# Patient Record
Sex: MALE | Race: OTHER | URBAN - METROPOLITAN AREA
[De-identification: names, ages, dates, MRNs, and addresses within clinical notes are randomized per-mention and may not be internally consistent; named-entity substitution may affect disease eponyms.]

---

## 2017-06-18 ENCOUNTER — EMERGENCY (EMERGENCY)
Facility: HOSPITAL | Age: 67
LOS: 1 days | Discharge: HOME | End: 2017-06-18
Admitting: INTERNAL MEDICINE
Payer: COMMERCIAL

## 2017-06-18 PROCEDURE — 99283 EMERGENCY DEPT VISIT LOW MDM: CPT | Mod: 25

## 2017-06-18 PROCEDURE — 90471 IMMUNIZATION ADMIN: CPT

## 2017-06-18 PROCEDURE — 12004 RPR S/N/AX/GEN/TRK7.6-12.5CM: CPT

## 2017-06-18 PROCEDURE — 90700 DTAP VACCINE < 7 YRS IM: CPT

## 2017-06-25 ENCOUNTER — EMERGENCY (EMERGENCY)
Facility: HOSPITAL | Age: 67
LOS: 1 days | Discharge: HOME | End: 2017-06-25
Admitting: INTERNAL MEDICINE
Payer: COMMERCIAL

## 2017-06-25 PROCEDURE — 73130 X-RAY EXAM OF HAND: CPT

## 2017-06-25 PROCEDURE — 73130 X-RAY EXAM OF HAND: CPT | Mod: 26,LT

## 2017-06-25 PROCEDURE — 99284 EMERGENCY DEPT VISIT MOD MDM: CPT

## 2017-06-25 PROCEDURE — 87205 SMEAR GRAM STAIN: CPT

## 2017-06-25 PROCEDURE — 36415 COLL VENOUS BLD VENIPUNCTURE: CPT

## 2017-06-25 PROCEDURE — 87070 CULTURE OTHR SPECIMN AEROBIC: CPT

## 2017-06-25 PROCEDURE — 99283 EMERGENCY DEPT VISIT LOW MDM: CPT | Mod: 25

## 2017-07-05 PROBLEM — Z00.00 ENCOUNTER FOR PREVENTIVE HEALTH EXAMINATION: Status: ACTIVE | Noted: 2017-07-05

## 2023-04-11 ENCOUNTER — OFFICE (OUTPATIENT)
Dept: URBAN - METROPOLITAN AREA CLINIC 34 | Facility: CLINIC | Age: 73
Setting detail: OPHTHALMOLOGY
End: 2023-04-11
Payer: COMMERCIAL

## 2023-04-11 DIAGNOSIS — H35.62: ICD-10-CM

## 2023-04-11 DIAGNOSIS — H43.813: ICD-10-CM

## 2023-04-11 DIAGNOSIS — H35.033: ICD-10-CM

## 2023-04-11 DIAGNOSIS — H43.391: ICD-10-CM

## 2023-04-11 PROCEDURE — 92250 FUNDUS PHOTOGRAPHY W/I&R: CPT | Performed by: OPHTHALMOLOGY

## 2023-04-11 PROCEDURE — 92014 COMPRE OPH EXAM EST PT 1/>: CPT | Performed by: OPHTHALMOLOGY

## 2023-04-11 ASSESSMENT — REFRACTION_CURRENTRX
OS_OVR_VA: 20/
OS_ADD: +3.50
OS_CYLINDER: +0.75
OS_AXIS: 179
OD_OVR_VA: 20/
OS_VPRISM_DIRECTION: BF
OD_AXIS: 137
OD_VPRISM_DIRECTION: BF
OS_ADD: +2.75
OD_OVR_VA: 20/
OS_SPHERE: -1.25
OD_CYLINDER: SPHERE
OS_VPRISM_DIRECTION: BF
OD_SPHERE: +0.75
OD_CYLINDER: +2.25
OD_AXIS: 147
OD_OVR_VA: 20/
OD_SPHERE: +2.50
OD_VPRISM_DIRECTION: BF
OS_VPRISM_DIRECTION: BF
OD_ADD: +0.25
OS_ADD: +2.75
OD_CYLINDER: +1.00
OS_CYLINDER: +2.75
OS_OVR_VA: 20/
OS_AXIS: 004
OS_OVR_VA: 20/
OD_SPHERE: -0.25
OD_VPRISM_DIRECTION: BF
OS_AXIS: 106
OS_CYLINDER: -1.75
OD_ADD: +2.75
OS_SPHERE: -0.75
OD_ADD: +3.25
OS_SPHERE: +1.75

## 2023-04-11 ASSESSMENT — LID EXAM ASSESSMENTS
OD_MEIBOMITIS: RLL 2+
OS_MEIBOMITIS: LLL 2+

## 2023-04-11 ASSESSMENT — REFRACTION_MANIFEST
OD_VA1: 20/50
OD_VA1: 20/40
OS_VA1: 20/40
OD_SPHERE: -6.50
OS_SPHERE: -0.75
OS_AXIS: 110
OS_AXIS: 089
OD_VA1: 20/50-1
OS_AXIS: 015
OD_CYLINDER: -0.50
OD_CYLINDER: +6.50
OD_AXIS: 155
OD_SPHERE: +0.75
OS_CYLINDER: +3.50
OD_AXIS: 045
OS_SPHERE: +2.00
OS_VA1: 20/50-2
OS_CYLINDER: -2.00
OS_VA1: 20/40+
OS_CYLINDER: +1.25
OD_SPHERE: +0.75
OS_SPHERE: PLANO

## 2023-04-11 ASSESSMENT — SPHEQUIV_DERIVED
OS_SPHEQUIV: 1
OD_SPHEQUIV: 0.5
OS_SPHEQUIV: 0.625
OS_SPHEQUIV: 1
OD_SPHEQUIV: -0.75
OD_SPHEQUIV: -3.25

## 2023-04-11 ASSESSMENT — AXIALLENGTH_DERIVED
OS_AL: 23.1201
OD_AL: 23.264
OD_AL: 23.7474
OS_AL: 23.2613
OD_AL: 24.7771
OS_AL: 23.1201

## 2023-04-11 ASSESSMENT — REFRACTION_AUTOREFRACTION
OS_SPHERE: -1.25
OD_CYLINDER: +7.00
OD_SPHERE: -4.25
OS_AXIS: 098
OS_CYLINDER: +3.75
OD_AXIS: 145

## 2023-04-11 ASSESSMENT — VISUAL ACUITY
OS_BCVA: 20/30
OD_BCVA: 20/40

## 2023-04-11 ASSESSMENT — CONFRONTATIONAL VISUAL FIELD TEST (CVF)
OD_FINDINGS: FULL
OS_FINDINGS: FULL

## 2023-04-11 ASSESSMENT — KERATOMETRY
OD_K1POWER_DIOPTERS: 41.25
OS_K1POWER_DIOPTERS: 42.50
METHOD_AUTO_MANUAL: AUTO
OD_K2POWER_DIOPTERS: 46.50
OS_AXISANGLE_DEGREES: 089
OD_AXISANGLE_DEGREES: 141
OS_K2POWER_DIOPTERS: 45.00

## 2023-04-11 ASSESSMENT — TONOMETRY
OS_IOP_MMHG: 20
OD_IOP_MMHG: 20

## 2023-04-11 ASSESSMENT — DRY EYES - PHYSICIAN NOTES: OS_GENERALCOMMENTS: TEAR FILM DEBRIS

## 2023-10-11 ENCOUNTER — OFFICE (OUTPATIENT)
Dept: URBAN - METROPOLITAN AREA CLINIC 34 | Facility: CLINIC | Age: 73
Setting detail: OPHTHALMOLOGY
End: 2023-10-11
Payer: MEDICARE

## 2023-10-11 DIAGNOSIS — H17.9: ICD-10-CM

## 2023-10-11 DIAGNOSIS — H11.153: ICD-10-CM

## 2023-10-11 DIAGNOSIS — Z96.1: ICD-10-CM

## 2023-10-11 PROCEDURE — 99213 OFFICE O/P EST LOW 20 MIN: CPT | Performed by: OPHTHALMOLOGY

## 2023-10-11 ASSESSMENT — REFRACTION_CURRENTRX
OD_ADD: +0.25
OD_ADD: +3.25
OS_CYLINDER: +2.75
OD_OVR_VA: 20/
OD_AXIS: 137
OS_AXIS: 106
OD_CYLINDER: +2.25
OS_CYLINDER: -1.75
OS_AXIS: 004
OD_VPRISM_DIRECTION: BF
OS_OVR_VA: 20/
OD_SPHERE: -0.25
OS_VPRISM_DIRECTION: BF
OD_AXIS: 147
OS_SPHERE: +1.75
OS_SPHERE: -1.25
OS_ADD: +3.50
OD_VPRISM_DIRECTION: BF
OS_SPHERE: -0.75
OS_CYLINDER: +0.75
OS_OVR_VA: 20/
OS_VPRISM_DIRECTION: BF
OS_VPRISM_DIRECTION: BF
OD_ADD: +2.75
OD_CYLINDER: +1.00
OS_ADD: +2.75
OD_SPHERE: +0.75
OS_AXIS: 179
OD_VPRISM_DIRECTION: BF
OS_OVR_VA: 20/
OS_ADD: +2.75
OD_OVR_VA: 20/
OD_SPHERE: +2.50
OD_OVR_VA: 20/
OD_CYLINDER: SPHERE

## 2023-10-11 ASSESSMENT — TONOMETRY
OS_IOP_MMHG: 20
OD_IOP_MMHG: 20

## 2023-10-11 ASSESSMENT — CONFRONTATIONAL VISUAL FIELD TEST (CVF)
OD_FINDINGS: FULL
OS_FINDINGS: FULL

## 2023-10-11 ASSESSMENT — REFRACTION_MANIFEST
OD_SPHERE: +0.75
OD_CYLINDER: +6.50
OD_AXIS: 045
OS_AXIS: 015
OS_SPHERE: -0.75
OS_SPHERE: +2.00
OD_VA1: 20/40
OS_AXIS: 089
OS_CYLINDER: +3.50
OD_SPHERE: +0.75
OS_CYLINDER: -2.00
OS_AXIS: 110
OD_VA1: 20/50
OS_VA1: 20/40+
OS_SPHERE: PLANO
OS_CYLINDER: +1.25
OD_VA1: 20/50-1
OS_VA1: 20/40
OD_AXIS: 155
OS_VA1: 20/50-2
OD_SPHERE: -6.50
OD_CYLINDER: -0.50

## 2023-10-11 ASSESSMENT — KERATOMETRY
OS_K2POWER_DIOPTERS: 47.00
OD_AXISANGLE_DEGREES: 142
OD_K1POWER_DIOPTERS: 41.25
OS_K1POWER_DIOPTERS: 44.00
OD_K2POWER_DIOPTERS: 47.00
METHOD_AUTO_MANUAL: AUTO
OS_AXISANGLE_DEGREES: 082

## 2023-10-11 ASSESSMENT — REFRACTION_AUTOREFRACTION
OS_AXIS: 100
OD_CYLINDER: +8.00
OS_CYLINDER: +3.75
OD_AXIS: 148
OS_SPHERE: -1.25
OD_SPHERE: -5.25

## 2023-10-11 ASSESSMENT — SPHEQUIV_DERIVED
OS_SPHEQUIV: 1
OD_SPHEQUIV: 0.5
OS_SPHEQUIV: 1
OD_SPHEQUIV: -3.25
OS_SPHEQUIV: 0.625
OD_SPHEQUIV: -1.25

## 2023-10-11 ASSESSMENT — AXIALLENGTH_DERIVED
OD_AL: 23.175
OS_AL: 22.6527
OD_AL: 23.8522
OS_AL: 22.5188
OD_AL: 24.6762
OS_AL: 22.5188

## 2023-10-11 ASSESSMENT — VISUAL ACUITY
OD_BCVA: 20/40
OS_BCVA: 20/40

## 2023-12-01 ENCOUNTER — APPOINTMENT (OUTPATIENT)
Dept: CARDIOLOGY | Facility: CLINIC | Age: 73
End: 2023-12-01
Payer: MEDICARE

## 2023-12-01 ENCOUNTER — NON-APPOINTMENT (OUTPATIENT)
Age: 73
End: 2023-12-01

## 2023-12-01 VITALS
WEIGHT: 190 LBS | RESPIRATION RATE: 16 BRPM | BODY MASS INDEX: 27.2 KG/M2 | SYSTOLIC BLOOD PRESSURE: 162 MMHG | OXYGEN SATURATION: 98 % | DIASTOLIC BLOOD PRESSURE: 100 MMHG | HEIGHT: 70 IN | HEART RATE: 108 BPM

## 2023-12-01 DIAGNOSIS — A04.8 OTHER SPECIFIED BACTERIAL INTESTINAL INFECTIONS: ICD-10-CM

## 2023-12-01 DIAGNOSIS — N13.8 BENIGN PROSTATIC HYPERPLASIA WITH LOWER URINARY TRACT SYMPMS: ICD-10-CM

## 2023-12-01 DIAGNOSIS — N40.1 BENIGN PROSTATIC HYPERPLASIA WITH LOWER URINARY TRACT SYMPMS: ICD-10-CM

## 2023-12-01 DIAGNOSIS — E13.9 OTHER SPECIFIED DIABETES MELLITUS W/OUT COMPLICATIONS: ICD-10-CM

## 2023-12-01 PROCEDURE — 99204 OFFICE O/P NEW MOD 45 MIN: CPT

## 2023-12-01 PROCEDURE — 93000 ELECTROCARDIOGRAM COMPLETE: CPT

## 2023-12-01 RX ORDER — PANTOPRAZOLE 40 MG/1
40 TABLET, DELAYED RELEASE ORAL
Refills: 0 | Status: ACTIVE | COMMUNITY

## 2023-12-01 RX ORDER — OMEPRAZOLE 20 MG/1
20 CAPSULE, DELAYED RELEASE ORAL
Refills: 0 | Status: ACTIVE | COMMUNITY

## 2023-12-01 RX ORDER — LINACLOTIDE 72 UG/1
CAPSULE, GELATIN COATED ORAL
Refills: 0 | Status: ACTIVE | COMMUNITY

## 2023-12-01 RX ORDER — METRONIDAZOLE 500 MG/1
500 TABLET ORAL
Refills: 0 | Status: ACTIVE | COMMUNITY

## 2023-12-01 RX ORDER — AMOXAPINE 50 MG/1
TABLET ORAL
Refills: 0 | Status: ACTIVE | COMMUNITY

## 2023-12-01 RX ORDER — FAMOTIDINE 40 MG/1
40 TABLET, FILM COATED ORAL
Refills: 0 | Status: ACTIVE | COMMUNITY

## 2023-12-01 RX ORDER — METOPROLOL SUCCINATE 50 MG/1
50 TABLET, EXTENDED RELEASE ORAL DAILY
Qty: 90 | Refills: 3 | Status: ACTIVE | COMMUNITY
Start: 2023-12-01 | End: 1900-01-01

## 2023-12-07 ENCOUNTER — APPOINTMENT (OUTPATIENT)
Dept: CARDIOLOGY | Facility: CLINIC | Age: 73
End: 2023-12-07
Payer: MEDICARE

## 2023-12-07 PROCEDURE — 78452 HT MUSCLE IMAGE SPECT MULT: CPT

## 2023-12-07 PROCEDURE — 93015 CV STRESS TEST SUPVJ I&R: CPT

## 2023-12-07 PROCEDURE — A9500: CPT

## 2023-12-14 ENCOUNTER — APPOINTMENT (OUTPATIENT)
Dept: CARDIOLOGY | Facility: CLINIC | Age: 73
End: 2023-12-14
Payer: MEDICARE

## 2023-12-14 PROCEDURE — 93306 TTE W/DOPPLER COMPLETE: CPT

## 2024-01-04 ENCOUNTER — APPOINTMENT (OUTPATIENT)
Dept: CARDIOLOGY | Facility: CLINIC | Age: 74
End: 2024-01-04
Payer: MEDICARE

## 2024-01-04 VITALS
BODY MASS INDEX: 26.92 KG/M2 | SYSTOLIC BLOOD PRESSURE: 160 MMHG | HEIGHT: 70 IN | HEART RATE: 63 BPM | WEIGHT: 188 LBS | DIASTOLIC BLOOD PRESSURE: 86 MMHG | OXYGEN SATURATION: 98 %

## 2024-01-04 PROCEDURE — 99213 OFFICE O/P EST LOW 20 MIN: CPT

## 2024-01-04 RX ORDER — LOSARTAN POTASSIUM 100 MG/1
100 TABLET, FILM COATED ORAL
Refills: 0 | Status: DISCONTINUED | COMMUNITY
End: 2024-01-04

## 2024-01-04 NOTE — CARDIOLOGY SUMMARY
[de-identified] : 12/07/2023: 1. Normal myocardial perfusion scan, with no evidence of infarction or inducible ischemia. 2. The patient underwent stress testing using the standard [facility] protocol. _ The patient exercised for 7 min 15 sec. _ The test was stopped due to maximum heart rate achieved, fatigue and completion of protocol. _ The peak heart rate was 126 bpm; 85 % of predicted maximal heart rate for this patient. _ The patient achieved 10.1 METS which is consistent with good exercise capacity. 3. Baseline electrocardiogram: normal sinus rhythm at a rate of 66 bpm with occasional premature ventricular contractions. 4. Patient achieved 10.1 METS, which is consistent with good exercise capacity. 5. Normal heart rate response. 6. Normal blood pressure response. 7. The resting left ventricular EF% is 71 %. The resting end diastolic volume is 89 ml and systolic volume is 26 ml. [de-identified] : 12/14/2023: Left ventricular cavity is normal. Left ventricular wall thickness is normal. Left ventricular systolic function is normal with an ejection fraction of 66 % by Locke's method of disks. There are no regional wall motion abnormalities seen. Normal left ventricular diastolic function, with normal filling pressure. Normal right ventricular cavity size, wall thickness, and systolic function. No pericardial effusion seen. Trace tricuspid regurgitation. Trace aortic regurgitation. Trace pulmonic regurgitation.

## 2024-01-04 NOTE — HISTORY OF PRESENT ILLNESS
[FreeTextEntry1] : 72yo man with a PMH of HTN, HL, DM, BPH, H. pylori on 3 PPIs and abx... awaiting clearance for EGD/colonoscopy scheduled for 1/2/2024.  Feeling well otherwise; denied any cardiac sx. EKG sinus tach 103bpm /100. On clonidine and states compliance.  No bbs.  01/04/2024: Presents for BPC and results review for echo and stress results. Patient endorses intermittent HA, and occasional lightheadedness. Patient poorly compliant with medication as of late. BP 160s/80s.

## 2024-01-04 NOTE — DISCUSSION/SUMMARY
[Hypertension] : hypertension [Not Responding to Treatment] : not responding to treatment [Medication Changes Per Orders] : Medication changes are as documented in orders [Dietary Modification] : dietary modification [Weight Loss] : weight loss [Low Sodium Diet] : low sodium diet [NSAIDs Avoidance] : non-steroidal anti-inflammatory drugs avoidance [Patient] : the patient [___ Week(s)] : in [unfilled] week(s) [de-identified] : stopped losartan; started valsartan [FreeTextEntry1] : 74yo man with a PMH of HTN, HL, DM, BPH, H. pylori on 3 PPIs and abx... awaiting clearance for EGD/colonoscopy scheduled for 1/2/2024.  Feeling well otherwise; denied any cardiac sx. EKG sinus tach 103bpm /100. On clonidine and states compliance.  No bbs.  As per GI... needs echo and ischemic eval -2D echo -nuc stress test to r/o ischemia  -add bb for improved BP/HR control  01/04/2024: Better HR but BP is uncontrolled still.

## 2024-01-05 ENCOUNTER — RX RENEWAL (OUTPATIENT)
Age: 74
End: 2024-01-05

## 2024-01-25 ENCOUNTER — APPOINTMENT (OUTPATIENT)
Dept: CARDIOLOGY | Facility: CLINIC | Age: 74
End: 2024-01-25
Payer: MEDICARE

## 2024-01-25 VITALS
OXYGEN SATURATION: 98 % | WEIGHT: 193 LBS | BODY MASS INDEX: 27.63 KG/M2 | HEART RATE: 67 BPM | DIASTOLIC BLOOD PRESSURE: 100 MMHG | HEIGHT: 70 IN | SYSTOLIC BLOOD PRESSURE: 160 MMHG

## 2024-01-25 PROCEDURE — 99214 OFFICE O/P EST MOD 30 MIN: CPT

## 2024-01-26 RX ORDER — ATORVASTATIN CALCIUM 20 MG/1
20 TABLET, FILM COATED ORAL
Refills: 0 | Status: ACTIVE | COMMUNITY

## 2024-01-26 RX ORDER — CLONIDINE HYDROCHLORIDE 0.1 MG/1
0.1 TABLET ORAL
Refills: 0 | Status: ACTIVE | COMMUNITY

## 2024-01-26 RX ORDER — TAMSULOSIN HYDROCHLORIDE 0.4 MG/1
0.4 CAPSULE ORAL
Refills: 0 | Status: ACTIVE | COMMUNITY

## 2024-01-26 RX ORDER — METFORMIN HYDROCHLORIDE 500 MG/1
500 TABLET, COATED ORAL
Refills: 0 | Status: ACTIVE | COMMUNITY

## 2024-01-26 NOTE — HISTORY OF PRESENT ILLNESS
[FreeTextEntry1] : 74yo man with a PMH of HTN, HL, DM, BPH, H. pylori on 3 PPIs and abx... awaiting clearance for EGD/colonoscopy scheduled for 1/2/2024.  Feeling well otherwise; denied any cardiac sx. EKG sinus tach 103bpm /100. On clonidine and states compliance.  No bbs.  01/04/2024: Presents for BPC and results review for echo and stress results. Patient endorses intermittent HA, and occasional lightheadedness. Patient poorly compliant with medication as of late. BP 160s/80s.   01/26/2024: Patient endorses intermittent HA. Patient has been much better with compliant with medication as of late. BP 160s/80s. Patient denies CP, SOB, Palpitations, Lightheadedness, dizziness.

## 2024-01-26 NOTE — DISCUSSION/SUMMARY
[Hypertension] : hypertension [Medication Changes Per Orders] : Medication changes are as documented in orders [Not Responding to Treatment] : not responding to treatment [Dietary Modification] : dietary modification [Weight Loss] : weight loss [Low Sodium Diet] : low sodium diet [NSAIDs Avoidance] : non-steroidal anti-inflammatory drugs avoidance [Patient] : the patient [___ Week(s)] : in [unfilled] week(s) [de-identified] : stopped losartan; started valsartan [FreeTextEntry1] : 74yo man with a PMH of HTN, HL, DM, BPH, H. pylori on 3 PPIs and abx... awaiting clearance for EGD/colonoscopy scheduled for 1/2/2024.  Feeling well otherwise; denied any cardiac sx. EKG sinus tach 103bpm /100. On clonidine and states compliance.  No bbs.  As per GI... needs echo and ischemic eval -2D echo -nuc stress test to r/o ischemia  -add bb for improved BP/HR control  01/04/2024: Better HR but BP is uncontrolled still.   01/26/2024: BP remains elevated increased Valsartan. F/U 3 weeks

## 2024-01-26 NOTE — CARDIOLOGY SUMMARY
[de-identified] : 12/07/2023: 1. Normal myocardial perfusion scan, with no evidence of infarction or inducible ischemia. 2. The patient underwent stress testing using the standard [facility] protocol. _ The patient exercised for 7 min 15 sec. _ The test was stopped due to maximum heart rate achieved, fatigue and completion of protocol. _ The peak heart rate was 126 bpm; 85 % of predicted maximal heart rate for this patient. _ The patient achieved 10.1 METS which is consistent with good exercise capacity. 3. Baseline electrocardiogram: normal sinus rhythm at a rate of 66 bpm with occasional premature ventricular contractions. 4. Patient achieved 10.1 METS, which is consistent with good exercise capacity. 5. Normal heart rate response. 6. Normal blood pressure response. 7. The resting left ventricular EF% is 71 %. The resting end diastolic volume is 89 ml and systolic volume is 26 ml. [de-identified] : 12/14/2023: Left ventricular cavity is normal. Left ventricular wall thickness is normal. Left ventricular systolic function is normal with an ejection fraction of 66 % by Locke's method of disks. There are no regional wall motion abnormalities seen. Normal left ventricular diastolic function, with normal filling pressure. Normal right ventricular cavity size, wall thickness, and systolic function. No pericardial effusion seen. Trace tricuspid regurgitation. Trace aortic regurgitation. Trace pulmonic regurgitation.

## 2024-01-26 NOTE — REASON FOR VISIT
Pharmacy called for lidocaine patch.   [CV Risk Factors and Non-Cardiac Disease] : CV risk factors and non-cardiac disease [Hypertension] : hypertension

## 2024-02-16 ENCOUNTER — APPOINTMENT (OUTPATIENT)
Dept: CARDIOLOGY | Facility: CLINIC | Age: 74
End: 2024-02-16
Payer: MEDICARE

## 2024-02-16 VITALS
WEIGHT: 187 LBS | HEIGHT: 70 IN | HEART RATE: 99 BPM | BODY MASS INDEX: 26.77 KG/M2 | SYSTOLIC BLOOD PRESSURE: 190 MMHG | OXYGEN SATURATION: 99 % | DIASTOLIC BLOOD PRESSURE: 100 MMHG

## 2024-02-16 DIAGNOSIS — E78.2 MIXED HYPERLIPIDEMIA: ICD-10-CM

## 2024-02-16 DIAGNOSIS — I10 ESSENTIAL (PRIMARY) HYPERTENSION: ICD-10-CM

## 2024-02-16 PROCEDURE — G2211 COMPLEX E/M VISIT ADD ON: CPT

## 2024-02-16 PROCEDURE — 99214 OFFICE O/P EST MOD 30 MIN: CPT

## 2024-02-27 NOTE — CARDIOLOGY SUMMARY
[de-identified] : 12/07/2023: 1. Normal myocardial perfusion scan, with no evidence of infarction or inducible ischemia. 2. The patient underwent stress testing using the standard [facility] protocol. _ The patient exercised for 7 min 15 sec. _ The test was stopped due to maximum heart rate achieved, fatigue and completion of protocol. _ The peak heart rate was 126 bpm; 85 % of predicted maximal heart rate for this patient. _ The patient achieved 10.1 METS which is consistent with good exercise capacity. 3. Baseline electrocardiogram: normal sinus rhythm at a rate of 66 bpm with occasional premature ventricular contractions. 4. Patient achieved 10.1 METS, which is consistent with good exercise capacity. 5. Normal heart rate response. 6. Normal blood pressure response. 7. The resting left ventricular EF% is 71 %. The resting end diastolic volume is 89 ml and systolic volume is 26 ml. [de-identified] : 12/14/2023: Left ventricular cavity is normal. Left ventricular wall thickness is normal. Left ventricular systolic function is normal with an ejection fraction of 66 % by Locke's method of disks. There are no regional wall motion abnormalities seen. Normal left ventricular diastolic function, with normal filling pressure. Normal right ventricular cavity size, wall thickness, and systolic function. No pericardial effusion seen. Trace tricuspid regurgitation. Trace aortic regurgitation. Trace pulmonic regurgitation.

## 2024-02-27 NOTE — HISTORY OF PRESENT ILLNESS
[FreeTextEntry1] : 74yo man with a PMH of HTN, HL, DM, BPH, H. pylori on 3 PPIs and abx... awaiting clearance for EGD/colonoscopy scheduled for 1/2/2024.  Feeling well otherwise; denied any cardiac sx. EKG sinus tach 103bpm /100. On clonidine and states compliance.  No bbs.  01/04/2024: Presents for BPC and results review for echo and stress results. Patient endorses intermittent HA, and occasional lightheadedness. Patient poorly compliant with medication as of late. BP 160s/80s.   01/26/2024: Patient endorses intermittent HA. Patient has been much better with compliant with medication as of late. BP 160s/80s. Patient denies CP, SOB, Palpitations, Lightheadedness, dizziness.  02/16/2024: Patient endorses intermittent HA several times a week. Patient has been much better with compliant with medication as of late. /100. Patient denies CP, SOB, Palpitations, Lightheadedness, dizziness.

## 2024-02-27 NOTE — DISCUSSION/SUMMARY
[Hypertension] : hypertension [Not Responding to Treatment] : not responding to treatment [Medication Changes Per Orders] : Medication changes are as documented in orders [Dietary Modification] : dietary modification [Weight Loss] : weight loss [Low Sodium Diet] : low sodium diet [NSAIDs Avoidance] : non-steroidal anti-inflammatory drugs avoidance [Patient] : the patient [___ Week(s)] : in [unfilled] week(s) [de-identified] : stopped losartan; started valsartan [FreeTextEntry1] : 72yo man with a PMH of HTN, HL, DM, BPH, H. pylori on 3 PPIs and abx... awaiting clearance for EGD/colonoscopy scheduled for 1/2/2024.  Feeling well otherwise; denied any cardiac sx. EKG sinus tach 103bpm /100. On clonidine and states compliance.  No bbs.  As per GI... needs echo and ischemic eval -2D echo -nuc stress test to r/o ischemia  -add bb for improved BP/HR control  01/04/2024: Better HR but BP is uncontrolled still.   01/26/2024: BP remains elevated increased Valsartan. F/U 3 weeks

## 2024-02-27 NOTE — ADDENDUM
[FreeTextEntry1] : Pt with normal nuc stress test and unremarkable echo; at low cardiovascular risk for a low risk procedure - colonoscopy. No cardiac contraindication to proceeding.

## 2024-04-01 ENCOUNTER — APPOINTMENT (OUTPATIENT)
Dept: CARDIOLOGY | Facility: CLINIC | Age: 74
End: 2024-04-01

## 2024-04-29 ENCOUNTER — RX RENEWAL (OUTPATIENT)
Age: 74
End: 2024-04-29

## 2024-04-30 ENCOUNTER — RX RENEWAL (OUTPATIENT)
Age: 74
End: 2024-04-30

## 2024-04-30 RX ORDER — VALSARTAN 320 MG/1
320 TABLET, COATED ORAL
Qty: 90 | Refills: 0 | Status: ACTIVE | COMMUNITY
Start: 2024-01-04 | End: 1900-01-01

## 2024-05-08 ENCOUNTER — OFFICE (OUTPATIENT)
Dept: URBAN - METROPOLITAN AREA CLINIC 34 | Facility: CLINIC | Age: 74
Setting detail: OPHTHALMOLOGY
End: 2024-05-08
Payer: MEDICARE

## 2024-05-08 DIAGNOSIS — H17.9: ICD-10-CM

## 2024-05-08 PROCEDURE — 99213 OFFICE O/P EST LOW 20 MIN: CPT | Performed by: OPHTHALMOLOGY

## 2024-05-08 ASSESSMENT — CONFRONTATIONAL VISUAL FIELD TEST (CVF)
OS_FINDINGS: FULL
OD_FINDINGS: FULL

## 2024-05-20 ENCOUNTER — RX RENEWAL (OUTPATIENT)
Age: 74
End: 2024-05-20

## 2024-05-20 RX ORDER — AMLODIPINE BESYLATE 10 MG/1
10 TABLET ORAL
Qty: 90 | Refills: 0 | Status: ACTIVE | COMMUNITY
Start: 2024-02-16 | End: 1900-01-01

## 2024-08-28 ENCOUNTER — RX RENEWAL (OUTPATIENT)
Age: 74
End: 2024-08-28

## 2024-11-13 ENCOUNTER — DOCTOR'S OFFICE (OUTPATIENT)
Facility: LOCATION | Age: 74
Setting detail: OPHTHALMOLOGY
End: 2024-11-13
Payer: MEDICARE

## 2024-11-13 DIAGNOSIS — H11.153: ICD-10-CM

## 2024-11-13 DIAGNOSIS — H52.213: ICD-10-CM

## 2024-11-13 DIAGNOSIS — H17.9: ICD-10-CM

## 2024-11-13 DIAGNOSIS — H35.40: ICD-10-CM

## 2024-11-13 DIAGNOSIS — Z96.1: ICD-10-CM

## 2024-11-13 DIAGNOSIS — H43.813: ICD-10-CM

## 2024-11-13 DIAGNOSIS — H00.022: ICD-10-CM

## 2024-11-13 DIAGNOSIS — H00.025: ICD-10-CM

## 2024-11-13 DIAGNOSIS — H35.033: ICD-10-CM

## 2024-11-13 PROCEDURE — 92014 COMPRE OPH EXAM EST PT 1/>: CPT | Performed by: OPHTHALMOLOGY

## 2024-11-13 ASSESSMENT — KERATOMETRY
OS_K1POWER_DIOPTERS: 40.75
OS_AXISANGLE_DEGREES: 080
OS_K2POWER_DIOPTERS: 43.250
METHOD_AUTO_MANUAL: AUTO
OD_AXISANGLE_DEGREES: 140
OD_K1POWER_DIOPTERS: 41.00
OD_K2POWER_DIOPTERS: 46.50

## 2024-11-13 ASSESSMENT — REFRACTION_CURRENTRX
OS_OVR_VA: 20/
OS_CYLINDER: +0.75
OS_SPHERE: -0.75
OS_SPHERE: +1.75
OD_VPRISM_DIRECTION: BF
OD_VPRISM_DIRECTION: BF
OD_CYLINDER: +2.25
OS_VPRISM_DIRECTION: BF
OS_ADD: +2.75
OD_ADD: +0.25
OD_AXIS: 137
OD_ADD: +2.75
OD_CYLINDER: SPHERE
OS_ADD: +3.50
OS_OVR_VA: 20/
OD_OVR_VA: 20/
OD_OVR_VA: 20/
OD_SPHERE: +2.50
OD_SPHERE: +0.75
OS_CYLINDER: -1.75
OD_SPHERE: -0.25
OS_SPHERE: -1.25
OS_ADD: +2.75
OS_CYLINDER: +2.75
OS_AXIS: 106
OS_VPRISM_DIRECTION: BF
OS_VPRISM_DIRECTION: BF
OS_AXIS: 179
OD_CYLINDER: +1.00
OD_VPRISM_DIRECTION: BF
OS_AXIS: 004
OD_AXIS: 147
OD_ADD: +3.25
OS_OVR_VA: 20/
OD_OVR_VA: 20/

## 2024-11-13 ASSESSMENT — REFRACTION_AUTOREFRACTION
OD_SPHERE: -5.25
OD_AXIS: 145
OD_CYLINDER: +8.00
OS_CYLINDER: +4.25
OS_SPHERE: -1.75
OS_AXIS: 100

## 2024-11-13 ASSESSMENT — LID EXAM ASSESSMENTS
OS_MEIBOMITIS: LLL 2+
OD_MEIBOMITIS: RLL 2+

## 2024-11-13 ASSESSMENT — REFRACTION_MANIFEST
OS_SPHERE: +2.00
OD_SPHERE: +0.75
OS_SPHERE: PLANO
OS_VA1: 20/40+
OD_SPHERE: +0.75
OS_AXIS: 015
OS_CYLINDER: +3.50
OS_VA1: 20/40
OD_VA1: 20/50-1
OS_CYLINDER: -2.00
OS_AXIS: 110
OD_AXIS: 045
OD_CYLINDER: -0.50
OD_CYLINDER: +6.50
OD_VA1: 20/50
OS_CYLINDER: +1.25
OD_SPHERE: -6.50
OD_VA1: 20/40
OS_VA1: 20/50-2
OS_SPHERE: -0.75
OD_AXIS: 155
OS_AXIS: 089

## 2024-11-13 ASSESSMENT — VISUAL ACUITY
OD_BCVA: 20/70
OS_BCVA: 20/25

## 2024-11-13 ASSESSMENT — CONFRONTATIONAL VISUAL FIELD TEST (CVF)
OD_FINDINGS: FULL
OS_FINDINGS: FULL

## 2024-11-13 ASSESSMENT — TONOMETRY
OD_IOP_MMHG: 20
OS_IOP_MMHG: 14

## 2025-03-13 ENCOUNTER — NON-APPOINTMENT (OUTPATIENT)
Age: 75
End: 2025-03-13

## 2025-03-13 ENCOUNTER — APPOINTMENT (OUTPATIENT)
Dept: CARDIOLOGY | Facility: CLINIC | Age: 75
End: 2025-03-13

## 2025-03-13 VITALS
HEIGHT: 70 IN | BODY MASS INDEX: 25.62 KG/M2 | RESPIRATION RATE: 16 BRPM | HEART RATE: 100 BPM | WEIGHT: 179 LBS | SYSTOLIC BLOOD PRESSURE: 150 MMHG | DIASTOLIC BLOOD PRESSURE: 82 MMHG | OXYGEN SATURATION: 98 %

## 2025-03-13 VITALS — DIASTOLIC BLOOD PRESSURE: 82 MMHG | SYSTOLIC BLOOD PRESSURE: 154 MMHG

## 2025-03-13 DIAGNOSIS — I10 ESSENTIAL (PRIMARY) HYPERTENSION: ICD-10-CM

## 2025-03-13 DIAGNOSIS — E78.2 MIXED HYPERLIPIDEMIA: ICD-10-CM

## 2025-03-13 PROCEDURE — 99214 OFFICE O/P EST MOD 30 MIN: CPT

## 2025-03-13 PROCEDURE — G2211 COMPLEX E/M VISIT ADD ON: CPT

## 2025-03-13 PROCEDURE — 93000 ELECTROCARDIOGRAM COMPLETE: CPT

## 2025-03-13 PROCEDURE — 36415 COLL VENOUS BLD VENIPUNCTURE: CPT

## 2025-03-13 RX ORDER — FAMOTIDINE 40 MG/1
40 TABLET, FILM COATED ORAL
Refills: 0 | Status: ACTIVE | COMMUNITY

## 2025-03-13 RX ORDER — CHLORTHALIDONE 25 MG/1
25 TABLET ORAL
Qty: 30 | Refills: 0 | Status: ACTIVE | COMMUNITY
Start: 2025-03-13 | End: 1900-01-01

## 2025-03-13 RX ORDER — FINERENONE 20 MG/1
20 TABLET, FILM COATED ORAL
Refills: 0 | Status: ACTIVE | COMMUNITY

## 2025-03-14 LAB
ALBUMIN SERPL ELPH-MCNC: 4.4 G/DL
ALP BLD-CCNC: 114 U/L
ALT SERPL-CCNC: 27 U/L
ANION GAP SERPL CALC-SCNC: 12 MMOL/L
AST SERPL-CCNC: 26 U/L
BILIRUB SERPL-MCNC: 0.6 MG/DL
BUN SERPL-MCNC: 15 MG/DL
CALCIUM SERPL-MCNC: 9.2 MG/DL
CHLORIDE SERPL-SCNC: 104 MMOL/L
CHOLEST SERPL-MCNC: 203 MG/DL
CO2 SERPL-SCNC: 26 MMOL/L
CREAT SERPL-MCNC: 1.04 MG/DL
EGFRCR SERPLBLD CKD-EPI 2021: 76 ML/MIN/1.73M2
GLUCOSE SERPL-MCNC: 137 MG/DL
HCT VFR BLD CALC: 41.5 %
HDLC SERPL-MCNC: 32 MG/DL
HGB BLD-MCNC: 13.6 G/DL
LDLC SERPL CALC-MCNC: 134 MG/DL
MCHC RBC-ENTMCNC: 29 PG
MCHC RBC-ENTMCNC: 32.8 G/DL
MCV RBC AUTO: 88.5 FL
NONHDLC SERPL-MCNC: 171 MG/DL
PLATELET # BLD AUTO: 180 K/UL
POTASSIUM SERPL-SCNC: 4.7 MMOL/L
PROT SERPL-MCNC: 7.1 G/DL
RBC # BLD: 4.69 M/UL
RBC # FLD: 14.2 %
SODIUM SERPL-SCNC: 142 MMOL/L
TRIGL SERPL-MCNC: 203 MG/DL
WBC # FLD AUTO: 5.99 K/UL

## 2025-03-14 RX ORDER — ROSUVASTATIN CALCIUM 20 MG/1
20 TABLET, FILM COATED ORAL
Qty: 90 | Refills: 1 | Status: ACTIVE | COMMUNITY
Start: 2025-03-14 | End: 1900-01-01